# Patient Record
Sex: FEMALE | Race: BLACK OR AFRICAN AMERICAN | NOT HISPANIC OR LATINO | URBAN - METROPOLITAN AREA
[De-identification: names, ages, dates, MRNs, and addresses within clinical notes are randomized per-mention and may not be internally consistent; named-entity substitution may affect disease eponyms.]

---

## 2023-11-03 ENCOUNTER — OFFICE VISIT (OUTPATIENT)
Dept: OBSTETRICS AND GYNECOLOGY | Facility: CLINIC | Age: 38
End: 2023-11-03
Payer: COMMERCIAL

## 2023-11-03 VITALS
HEIGHT: 69 IN | BODY MASS INDEX: 40.88 KG/M2 | DIASTOLIC BLOOD PRESSURE: 80 MMHG | WEIGHT: 276 LBS | SYSTOLIC BLOOD PRESSURE: 136 MMHG

## 2023-11-03 DIAGNOSIS — N93.9 ABNORMAL UTERINE BLEEDING (AUB): Primary | ICD-10-CM

## 2023-11-03 DIAGNOSIS — Z11.3 ROUTINE SCREENING FOR STI (SEXUALLY TRANSMITTED INFECTION): ICD-10-CM

## 2023-11-03 DIAGNOSIS — Z30.430 ENCOUNTER FOR IUD INSERTION: ICD-10-CM

## 2023-11-03 DIAGNOSIS — N93.8 OTHER SPECIFIED ABNORMAL UTERINE AND VAGINAL BLEEDING: ICD-10-CM

## 2023-11-03 PROBLEM — B00.9 HERPES: Status: ACTIVE | Noted: 2023-11-03

## 2023-11-03 PROBLEM — R68.89 COLD INTOLERANCE: Status: ACTIVE | Noted: 2023-11-03

## 2023-11-03 PROBLEM — Z97.5 IUD (INTRAUTERINE DEVICE) IN PLACE: Status: ACTIVE | Noted: 2023-11-03

## 2023-11-03 PROBLEM — B96.89 BACTERIAL VAGINITIS: Status: ACTIVE | Noted: 2023-11-03

## 2023-11-03 PROBLEM — E66.9 OBESITY: Status: ACTIVE | Noted: 2023-11-03

## 2023-11-03 PROBLEM — E78.5 HYPERLIPIDEMIA: Status: ACTIVE | Noted: 2023-11-03

## 2023-11-03 PROBLEM — L50.9 URTICARIA: Status: ACTIVE | Noted: 2023-11-03

## 2023-11-03 PROBLEM — F32.A DEPRESSION: Status: ACTIVE | Noted: 2023-11-03

## 2023-11-03 PROBLEM — E03.9 HYPOTHYROID: Status: ACTIVE | Noted: 2023-11-03

## 2023-11-03 PROBLEM — L50.8 OTHER URTICARIA: Status: ACTIVE | Noted: 2022-02-08

## 2023-11-03 PROBLEM — N76.0 BACTERIAL VAGINITIS: Status: ACTIVE | Noted: 2023-11-03

## 2023-11-03 PROBLEM — R73.03 PREDIABETES: Status: ACTIVE | Noted: 2023-11-03

## 2023-11-03 PROBLEM — L25.9 CONTACT DERMATITIS: Status: ACTIVE | Noted: 2023-11-03

## 2023-11-03 PROBLEM — G56.03 CARPAL TUNNEL SYNDROME ON BOTH SIDES: Status: ACTIVE | Noted: 2023-11-03

## 2023-11-03 PROBLEM — N39.0 RECURRENT URINARY TRACT INFECTION: Status: ACTIVE | Noted: 2023-11-03

## 2023-11-03 LAB — PREGNANCY TEST URINE, POC: NEGATIVE

## 2023-11-03 PROCEDURE — 87591 N.GONORRHOEAE DNA AMP PROB: CPT

## 2023-11-03 PROCEDURE — 87661 TRICHOMONAS VAGINALIS AMPLIF: CPT

## 2023-11-03 PROCEDURE — 87800 DETECT AGNT MULT DNA DIREC: CPT

## 2023-11-03 PROCEDURE — 87491 CHLMYD TRACH DNA AMP PROBE: CPT

## 2023-11-03 PROCEDURE — 81025 URINE PREGNANCY TEST: CPT | Performed by: STUDENT IN AN ORGANIZED HEALTH CARE EDUCATION/TRAINING PROGRAM

## 2023-11-03 PROCEDURE — 99214 OFFICE O/P EST MOD 30 MIN: CPT | Performed by: STUDENT IN AN ORGANIZED HEALTH CARE EDUCATION/TRAINING PROGRAM

## 2023-11-03 PROCEDURE — 88305 TISSUE EXAM BY PATHOLOGIST: CPT

## 2023-11-03 PROCEDURE — 58100 BIOPSY OF UTERUS LINING: CPT | Performed by: STUDENT IN AN ORGANIZED HEALTH CARE EDUCATION/TRAINING PROGRAM

## 2023-11-03 RX ORDER — CETIRIZINE HYDROCHLORIDE 10 MG/1
1 TABLET ORAL
COMMUNITY
Start: 2021-01-06

## 2023-11-03 RX ORDER — VALACYCLOVIR HYDROCHLORIDE 500 MG/1
1 TABLET, FILM COATED ORAL DAILY
COMMUNITY
Start: 2023-06-05 | End: 2023-12-26 | Stop reason: SDUPTHER

## 2023-11-03 RX ORDER — QUETIAPINE FUMARATE 400 MG/1
1 TABLET, FILM COATED ORAL NIGHTLY
COMMUNITY

## 2023-11-03 RX ORDER — LITHIUM CARBONATE 300 MG/1
TABLET, FILM COATED, EXTENDED RELEASE ORAL
COMMUNITY
Start: 2023-07-05

## 2023-11-03 RX ORDER — TRETINOIN 0.25 MG/G
CREAM TOPICAL
COMMUNITY

## 2023-11-03 RX ORDER — DIVALPROEX SODIUM 500 MG/1
1000 TABLET, FILM COATED, EXTENDED RELEASE ORAL NIGHTLY
COMMUNITY
Start: 2023-06-28

## 2023-11-03 RX ORDER — CYCLOBENZAPRINE HCL 10 MG
1 TABLET ORAL NIGHTLY
COMMUNITY
Start: 2020-02-10

## 2023-11-03 RX ORDER — ASPIRIN 81 MG/1
1 TABLET ORAL DAILY
COMMUNITY

## 2023-11-03 RX ORDER — LEVOTHYROXINE SODIUM 25 UG/1
1 TABLET ORAL DAILY
COMMUNITY
Start: 2021-01-25

## 2023-11-03 RX ORDER — FAMOTIDINE 40 MG/1
1 TABLET, FILM COATED ORAL DAILY
COMMUNITY
Start: 2023-11-01

## 2023-11-03 RX ORDER — HYDROXYZINE HYDROCHLORIDE 25 MG/1
1 TABLET, FILM COATED ORAL EVERY 8 HOURS PRN
COMMUNITY
Start: 2021-01-06

## 2023-11-03 RX ORDER — PROPRANOLOL HYDROCHLORIDE 20 MG/1
1 TABLET ORAL 2 TIMES DAILY
COMMUNITY

## 2023-11-03 RX ORDER — DIPHENHYDRAMINE HCL 25 MG
1 CAPSULE ORAL
COMMUNITY
Start: 2021-04-29

## 2023-11-03 RX ORDER — LEVONORGESTREL 52 MG/1
INTRAUTERINE DEVICE INTRAUTERINE
COMMUNITY

## 2023-11-03 RX ORDER — DIVALPROEX SODIUM 250 MG/1
3 TABLET, FILM COATED, EXTENDED RELEASE ORAL NIGHTLY
COMMUNITY
Start: 2023-04-05

## 2023-11-03 RX ORDER — ESCITALOPRAM OXALATE 20 MG/1
1 TABLET ORAL DAILY
COMMUNITY
Start: 2021-01-06

## 2023-11-03 RX ORDER — EPINEPHRINE 0.3 MG/.3ML
INJECTION SUBCUTANEOUS
COMMUNITY
Start: 2021-06-11

## 2023-11-03 NOTE — PROGRESS NOTES
Subjective   Patient ID: Luz Mayes is a 38 y.o. female who presents for Vaginal Bleeding.  Presents for discussion of AUB.  Had been having regular monthly menses but missed her period in August, then period started the first week of September and didn't stop until 10/19.  Never had anything like this before.  Very heavy at times with passage of large blood clots.        Review of Systems   All other systems reviewed and are negative.      Objective   Physical Exam  Constitutional:       Appearance: Normal appearance.   HENT:      Head: Normocephalic and atraumatic.      Nose: Nose normal.      Mouth/Throat:      Mouth: Mucous membranes are moist.      Pharynx: No oropharyngeal exudate or posterior oropharyngeal erythema.   Eyes:      Extraocular Movements: Extraocular movements intact.      Conjunctiva/sclera: Conjunctivae normal.   Pulmonary:      Effort: Pulmonary effort is normal.   Genitourinary:     Vagina: Normal.      Cervix: Normal.      Uterus: Normal.       Rectum: Normal.      Comments: Uterus anteverted  Musculoskeletal:      Cervical back: Normal range of motion.   Skin:     Findings: No bruising, erythema, lesion or rash.   Neurological:      General: No focal deficit present.      Mental Status: She is alert.   Psychiatric:         Mood and Affect: Mood normal.         Behavior: Behavior normal.         Thought Content: Thought content normal.         Judgment: Judgment normal.         IUD Insertion    Date/Time: 11/3/2023 3:26 PM    Performed by: Brandy Jensen MD  Authorized by: Brandy Jensen MD    Consent:     Consent obtained:  Verbal and written    Consent given by:  Patient    Procedure risks and benefits discussed: yes      Patient questions answered: yes      Patient agrees, verbalizes understanding, and wants to proceed: yes      Educational handouts given: yes      Instructions and paperwork completed: yes    Universal protocol:     Patient states understanding of procedure being  performed: yes      Relevant documents present and verified: yes      Test results available and properly labeled: yes      Imaging studies available: yes      Required blood products, implants, devices, and special equipment available: yes      Site marked: yes    Procedure:     Pelvic exam performed: yes      Negative GC/chlamydia test: ordered, pending.      Negative urine pregnancy test: yes      Cervix cleaned and prepped: yes      Speculum placed in vagina: yes      Tenaculum applied to cervix: yes      Uterus sounded: yes      Uterus sound depth (cm):  9    IUD inserted with no complications: yes      IUD type:  Mirena    Strings trimmed: yes    Post-procedure:     Patient tolerated procedure well: yes    Endometrial biopsy    Date/Time: 11/3/2023 5:25 PM    Performed by: Brandy Jensen MD  Authorized by: Brandy Jensen MD    Consent:     Consent obtained: written and verbal    Consent given by: patient    Risks discussed: bleeding, infection and pain    Patient agrees, verbalizes understanding, and wants to proceed: yes    Universal protocol:     Test results available and properly labeled: yes      Relevant documents present and verified: yes      Imaging studies available: yes      Required blood products, implants, devices, and special equipment available: yes      Site/side marked: yes      Immediately prior to procedure a time out was called: yes      Patient identity confirmed: verbally with patient  Indications:     Indications: abnormal uterine bleeding      Chronicity of post-menopausal bleeding: new  Pre-procedure:     Urine pregnancy test: negative    Procedure:     A bimanual exam was performed: yes      Uterus size: 9-10 weeks    Uterus position: anteverted    Prepped with: Betadine    Tenaculum used: yes      A local block was performed: yes      Block method: paracervical block      Local anesthetic: lidocaine 1% WITH epi    Amount used (mL): 12    Cervix dilated: no      Number of passes:  1  Findings:     Cervix: normal      Uterus depth by sound (cm): 9    Specimen collected: specimen collected and sent to pathology      Patient tolerance: tolerated well, no immediate complications        Assessment/Plan   Diagnoses and all orders for this visit:  Abnormal uterine bleeding (AUB)        -     Discussed with patient the broad differential diagnosis of AUB prior to menopause, including structural etiologies such as polyps, adenomyosis, malignancy, hyperplasia, or leiomyomas, as well as non-structural etiologies including coagulopathy, ovulatory dysfunction, endometrial dysfunction, and iatrogenic etiologies. Reviewed that optimal therapy is often dependent on etiology of bleeding, though most patients with AUB may benefit from either medical or surgical management, depending on their goals and preferences.  Reviewed that medication management, in general, may include short-acting options such as oral hormonal or non-hormonal therapies, injectable agents administered at 1-3 month intervals depending on the agent, and implantable therapies such as the levonorgestrel intrauterine device or Nexplanon subdermal contraceptive implant.  Reviewed that surgical options, in general, include less invasive procedures such as uterine artery embolization, endometrial ablation, and hysteroscopy with resection of structural lesions, if present, while hysterectomy represents definitive management, but is also a major surgery, even if performed via minimally invasive techniques.        -     following counseling as above, she was amenable to EMB and trial of Mirena        -     POC pregnancy, urine, was negative  -     Endometrial biopsy completed  -     levonorgestrel (Mirena) 21 mcg/24 hours (8 yrs) 52 mg IUD placed  -     C. Trachomatis / N. Gonorrhoeae, Amplified Detection, and Trichomonas vaginalis, Amplified, were sent today  -     Pelvic ultrasound also ordered to assess for structural abnormalities.  Will review  NV    She will be due for pap in January and we discussed that this would be a good time to follow-up to assess her response to therapy.  She was amenable to same.  To call office with problems in the interim.    Brandy Jensen MD

## 2023-11-04 LAB
C TRACH RRNA SPEC QL NAA+PROBE: NEGATIVE
N GONORRHOEA DNA SPEC QL PROBE+SIG AMP: NEGATIVE
T VAGINALIS RRNA SPEC QL NAA+PROBE: NEGATIVE

## 2023-11-20 ENCOUNTER — HOSPITAL ENCOUNTER (OUTPATIENT)
Dept: RADIOLOGY | Facility: CLINIC | Age: 38
Discharge: HOME | End: 2023-11-20
Payer: COMMERCIAL

## 2023-11-20 DIAGNOSIS — N93.9 ABNORMAL UTERINE BLEEDING (AUB): ICD-10-CM

## 2023-11-20 PROCEDURE — 76856 US EXAM PELVIC COMPLETE: CPT | Performed by: RADIOLOGY

## 2023-11-20 PROCEDURE — 76830 TRANSVAGINAL US NON-OB: CPT

## 2023-11-20 PROCEDURE — 76830 TRANSVAGINAL US NON-OB: CPT | Performed by: RADIOLOGY

## 2023-11-24 LAB
LABORATORY COMMENT REPORT: NORMAL
PATH REPORT.FINAL DX SPEC: NORMAL
PATH REPORT.GROSS SPEC: NORMAL
PATH REPORT.RELEVANT HX SPEC: NORMAL
PATH REPORT.TOTAL CANCER: NORMAL

## 2023-12-26 DIAGNOSIS — A60.04 HERPES SIMPLEX VULVOVAGINITIS: Primary | ICD-10-CM

## 2023-12-26 RX ORDER — VALACYCLOVIR HYDROCHLORIDE 500 MG/1
500 TABLET, FILM COATED ORAL DAILY
Qty: 30 TABLET | Refills: 11 | Status: SHIPPED | OUTPATIENT
Start: 2023-12-26 | End: 2024-12-25

## 2023-12-26 RX ORDER — VALACYCLOVIR HYDROCHLORIDE 500 MG/1
TABLET, FILM COATED ORAL
Qty: 90 TABLET | Refills: 0 | OUTPATIENT
Start: 2023-12-26

## 2024-01-12 ENCOUNTER — OFFICE VISIT (OUTPATIENT)
Dept: OBSTETRICS AND GYNECOLOGY | Facility: CLINIC | Age: 39
End: 2024-01-12
Payer: COMMERCIAL

## 2024-01-12 VITALS
HEIGHT: 69 IN | DIASTOLIC BLOOD PRESSURE: 78 MMHG | BODY MASS INDEX: 42.06 KG/M2 | SYSTOLIC BLOOD PRESSURE: 120 MMHG | WEIGHT: 284 LBS

## 2024-01-12 DIAGNOSIS — Z01.419 ENCOUNTER FOR GYNECOLOGICAL EXAMINATION WITHOUT ABNORMAL FINDING: ICD-10-CM

## 2024-01-12 DIAGNOSIS — Z12.31 ENCOUNTER FOR SCREENING MAMMOGRAM FOR BREAST CANCER: ICD-10-CM

## 2024-01-12 DIAGNOSIS — Z01.419 WELL WOMAN EXAM: ICD-10-CM

## 2024-01-12 DIAGNOSIS — Z11.3 ROUTINE SCREENING FOR STI (SEXUALLY TRANSMITTED INFECTION): Primary | ICD-10-CM

## 2024-01-12 PROCEDURE — 87624 HPV HI-RISK TYP POOLED RSLT: CPT

## 2024-01-12 PROCEDURE — 99396 PREV VISIT EST AGE 40-64: CPT | Performed by: STUDENT IN AN ORGANIZED HEALTH CARE EDUCATION/TRAINING PROGRAM

## 2024-01-12 PROCEDURE — 87661 TRICHOMONAS VAGINALIS AMPLIF: CPT

## 2024-01-12 PROCEDURE — 87800 DETECT AGNT MULT DNA DIREC: CPT

## 2024-01-12 PROCEDURE — 1036F TOBACCO NON-USER: CPT | Performed by: STUDENT IN AN ORGANIZED HEALTH CARE EDUCATION/TRAINING PROGRAM

## 2024-01-12 PROCEDURE — 88141 CYTOPATH C/V INTERPRET: CPT | Performed by: PATHOLOGY

## 2024-01-12 PROCEDURE — 88142 CYTOPATH C/V THIN LAYER: CPT

## 2024-01-12 ASSESSMENT — PAIN SCALES - GENERAL: PAINLEVEL: 0-NO PAIN

## 2024-01-12 NOTE — PROGRESS NOTES
Subjective   Luz Mayes is a 38 y.o. female here for a routine exam. Current complaints: none.     Since IUD placement bleeding has been well controlled and she is happy with this method, amenable to continue.    Gynecologic History  No LMP recorded. Patient has had an implant.  Contraception: IUD  Last Pap: 1/2019. Results were: normal  Last mammogram: needs.    Objective   Physical Exam  Vitals and nursing note reviewed.   Constitutional:       Appearance: Normal appearance.   HENT:      Head: Normocephalic and atraumatic.      Nose: Nose normal.      Mouth/Throat:      Mouth: Mucous membranes are moist.   Eyes:      Extraocular Movements: Extraocular movements intact.      Conjunctiva/sclera: Conjunctivae normal.   Pulmonary:      Effort: Pulmonary effort is normal.   Chest:      Chest wall: No deformity or swelling.   Breasts:     Breasts are symmetrical.      Right: Normal.      Left: Normal.   Abdominal:      General: There is no distension.      Palpations: Abdomen is soft. There is no mass.      Tenderness: There is no abdominal tenderness.   Genitourinary:     General: Normal vulva.      Vagina: Normal.      Cervix: Normal.      Uterus: Normal.       Adnexa: Right adnexa normal and left adnexa normal.      Rectum: Normal.   Musculoskeletal:      Cervical back: Normal range of motion.   Skin:     General: Skin is warm and dry.   Neurological:      General: No focal deficit present.      Mental Status: She is alert.   Psychiatric:         Mood and Affect: Mood normal.         Behavior: Behavior normal.         Thought Content: Thought content normal.         Judgment: Judgment normal.          Assessment/Plan   Healthy female exam.  1. Routine screening for STI (sexually transmitted infection)  - HIV 1/2 Antigen/Antibody Screen with Reflex to Confirmation; Future  - Syphilis Screen with Reflex; Future  - Hepatitis C antibody; Future  - Hepatitis B Surface Antigen; Future  - BI mammo bilateral  screening tomosynthesis; Future  - HPV DNA High Risk With Genotype  - C. Trachomatis / N. Gonorrhoeae, Amplified Detection  - Trichomonas vaginalis, Nucleic Acid Detection    2. Encounter for screening mammogram for breast cancer  - BI mammo bilateral screening tomosynthesis; Future    3. Encounter for gynecological examination without abnormal finding  - THINPREP PAP TEST    Follow up for well care or as needed    Brandy Jensen MD

## 2024-01-16 LAB
C TRACH RRNA SPEC QL NAA+PROBE: NEGATIVE
N GONORRHOEA DNA SPEC QL PROBE+SIG AMP: NEGATIVE

## 2024-01-17 LAB — T VAGINALIS RRNA SPEC QL NAA+PROBE: NEGATIVE

## 2024-01-29 LAB
CYTOLOGY CMNT CVX/VAG CYTO-IMP: NORMAL
HPV HR 12 DNA GENITAL QL NAA+PROBE: NEGATIVE
HPV HR GENOTYPES PNL CVX NAA+PROBE: NEGATIVE
HPV16 DNA SPEC QL NAA+PROBE: NEGATIVE
HPV18 DNA SPEC QL NAA+PROBE: NEGATIVE
LAB AP HPV GENOTYPE QUESTION: YES
LAB AP HPV HR: NORMAL
LAB AP PAP ADDITIONAL TESTS: NORMAL
LABORATORY COMMENT REPORT: NORMAL
LABORATORY COMMENT REPORT: NORMAL
PATH REPORT.TOTAL CANCER: NORMAL

## 2024-12-28 DIAGNOSIS — A60.04 HERPES SIMPLEX VULVOVAGINITIS: ICD-10-CM

## 2025-01-03 DIAGNOSIS — A60.04 HERPES SIMPLEX VULVOVAGINITIS: ICD-10-CM

## 2025-01-03 RX ORDER — VALACYCLOVIR HYDROCHLORIDE 500 MG/1
500 TABLET, FILM COATED ORAL DAILY
Qty: 30 TABLET | Refills: 0 | Status: SHIPPED | OUTPATIENT
Start: 2025-01-03 | End: 2025-01-03 | Stop reason: SDUPTHER

## 2025-01-03 RX ORDER — VALACYCLOVIR HYDROCHLORIDE 500 MG/1
500 TABLET, FILM COATED ORAL DAILY
Qty: 90 TABLET | Refills: 3 | Status: SHIPPED | OUTPATIENT
Start: 2025-01-03 | End: 2026-01-03

## 2025-06-16 ENCOUNTER — APPOINTMENT (OUTPATIENT)
Dept: RADIOLOGY | Facility: HOSPITAL | Age: 40
End: 2025-06-16
Payer: COMMERCIAL

## 2025-06-16 DIAGNOSIS — Z12.31 SCREENING MAMMOGRAM FOR BREAST CANCER: ICD-10-CM

## 2025-06-23 ENCOUNTER — APPOINTMENT (OUTPATIENT)
Dept: RADIOLOGY | Facility: HOSPITAL | Age: 40
End: 2025-06-23
Payer: COMMERCIAL

## 2025-06-23 DIAGNOSIS — Z12.31 SCREENING MAMMOGRAM FOR BREAST CANCER: ICD-10-CM

## 2025-06-23 PROCEDURE — 77063 BREAST TOMOSYNTHESIS BI: CPT | Performed by: RADIOLOGY

## 2025-06-23 PROCEDURE — 77067 SCR MAMMO BI INCL CAD: CPT | Performed by: RADIOLOGY

## 2025-06-23 PROCEDURE — 77067 SCR MAMMO BI INCL CAD: CPT

## 2025-06-25 ENCOUNTER — HOSPITAL ENCOUNTER (OUTPATIENT)
Dept: RADIOLOGY | Facility: EXTERNAL LOCATION | Age: 40
Discharge: HOME | End: 2025-06-25

## 2025-07-20 ENCOUNTER — TELEPHONE (OUTPATIENT)
Dept: PRIMARY CARE | Facility: CLINIC | Age: 40
End: 2025-07-20
Payer: COMMERCIAL

## 2025-07-20 NOTE — TELEPHONE ENCOUNTER
Please advise is patient's insurance is Medicare or Medicaid; it is unclear.     she will coming for appt 7/22/25.Unclear if appt is a physical vs Medicare wellness visit. Please advise. Thank you

## 2025-07-20 NOTE — PROGRESS NOTES
"Subjective   Luz J Spikes \"En\" is a 40 y.o. female who presents for new patient visit to establish PCP care for  Medicare wellness Visit and annual physical exam.    HPI:      40 y.o. female ex-smoker (5 black and mild/day x  ; quit 2023) x  who presents for new patient visit to establish PCP care for Medicare wellness Visit and annual physical exam.         EMR/EPIC records reviewed    PMHx:  Bipolar disorder  CKD stage 3A  Hyperlipidemia  Hypothyroid  Depression  Bacterial vaginitis  Carpal tunnel syndrome on both sides  Cold intolerance  Contact dermatitis  Genital Herpes  Morbid obesity  Recurrent urinary tract infection  Urticaria  Onychomycosis  Uterine fibroids         Healthcare Providers:  GYN: Dr. Jensen  Psychiatry: Pilar Arnett APN    GI: None  [facility] Prior PCP: BOAZ Hewitt.CNP         Preventive Health Services:  -Last Medicare wellness visit/ physical: 7/22/2025  -last pap smear: 1/2024 Negative for intraepithelial lesion or malignancy.   -last mammogram: 6/23/2025 No mammographic evidence of malignancy. ;  Next due 6/23/2026  -last colonoscopy/cologuard:  never; + Fhx colon cancer father (?diagnosed at age 70s but unsure) and denies any other Fhx of colon cancer; colon cancer screening due at DUE at age 42 yo  -last STI screening: ?; declined STI testing  -Hep C screening: ?       Immunizations:  -Childhood vaccines: completed per patient  -updated COVID spike vaccine: NOW DUE  -TDAP: Now due      Immunization History   Administered Date(s) Administered    Flu vaccine (IIV4), preservative free *Check age/dose* 10/24/2018       Today En reports:     - Ongoing intermittent abdominal pain, cramping, and constipation x 1+ years, rated in severity as /10, not worsening over time.  She denies fever/chills, nausea/vomiting, abdominal bloating,, unintentional weight loss, early satiety, bloody stools, melena, or family history of colon or GI cancer.  At her last visit with her prior PCP at " [facility] referral to gastroenterology was ordered for evaluation of abdominal pain, cramping and constipation although she reports that she never scheduled the appointment.  Today she expressed interest in referral to gastroenterology for further evaluation.    -Mood is good.  She denies depressive, manic, or psychotic symptoms.  She denies suicidal or homicidal ideation, intent, or plan.  Follows with psychiatry and is taking all psychiatric medications as prescribed    - Otherwise doing and feeling well     -taking all medications as prescribed with no reported adverse medication side effects    -smokes cannabis that she gets from a dispensary. She denies any other illicit drug use.           Today   she has no other reported complaints, issues, or problems.  ROS is NEG for HEADACHE, NAUSEA, VOMITING, DIARRHEA, CHEST PAIN, SOB, and BLEEDING.   Review of systems (12) is negative for all systems except for any identified issues in HPI above.        Health Maintenance    Social History:  Tobacco: ex-smoker  EtOH: denies  Patient reports routine vision checks and dental cleanings, and regular exercise.     Advanced Directives:  Patient does not have advanced directives in place.  Counseled and discussed importance with patient during visit today.  Provided assistance as necessary.    Opioid Medications:  Does the patient use opioid medications: no      Overall Health:  How does the patient rate their health status today:  good     Cognitive Screen:  AAAx3  to person, place and time: Yes  3 word recall: Banana, Chair, Sunrise  - Immediate recall: Yes  - 5 minutes recall: Yes  Impression: No cognitive deficiency observed during screening or encounter today     Vision/Hearing Screen:  Vision (required for WELCOME TO MEDICARE VISIT ONLY):  na  Hearing screen: reports no difficulty with hearing and passes finger rub test bilaterally    Immunizations:  -Childhood vaccines: completed per patient  -updated COVID spike vaccine: NOW  DUE  -TDAP: Now due    Flu:  see below      Immunization History   Administered Date(s) Administered    Flu vaccine (IIV4), preservative free *Check age/dose* 10/24/2018       Preventive Health Services:  -Last physical: 7/22/2025  -last pap smear: 1/2024 Negative for intraepithelial lesion or malignancy.   -last mammogram: 6/23/2025 No mammographic evidence of malignancy. ;  Next due 6/23/2026  -last colonoscopy/cologuard:   -last STI screening:   -Hep C screening:        Screening Pap due 21-65, Q3, Q5 with nml HPV after 31 y/o  Screening Mammogram :  yearly ages 40-75, shared decision making age >75  Screening Colonoscopy:  age 45-75, shared decision making age >75  DEXA scan 65 or 60 with risks, [id number] after  AAA screen men 65-75 men with ANY smoking history  Low dose CT of lungs:  yearly for 50-80 with at least 20 year pack history.  Current smokers and those who quit <15 years ago.  Coronary Ca score men >45, women >55,   Hep C screen:  one time all adults age 18-79        Reviewed:   Past Medical History/Allergies:  Yes  Family History:  Yes  Social History:  Yes  Current Medications:  Yes  Vital Signs:  Yes  Advanced Directives:  discussed  Immunizations:  reviewed today  Home Safety:                    Up & Go test > 30 seconds?  No                   Home have rugs; lack grab bars in bathroom; lack handrail on stairs; have poor lighting?  No                   Hearing difficulties?  No  Geriatric Assessment           ADL areas requiring assistance:  Does not need help with , Dressing, Eating, Ambulating, Toileting, Grooming, Hygiene.            IADL areas requiring assistance:  Does not need help with , Shopping, Housework, Accounting, Transportation, Driving.   Medications reviewed           Current supplements  Reviewed and recorded.   Other providers           Reviewed and recorded  Current providers and suppliers:     PHQ9/GAD7:  Over the past 2 weeks, how often have you been bothered by any of the  following problems?  Trouble falling or staying asleep, or sleeping too much: Several days  Feeling tired or having little energy: Several days  Poor appetite or overeating: Not at all  Feeling bad about yourself - or that you are a failure or have let yourself or your family down: Several days  Trouble concentrating on things, such as reading the newspaper or watching television: Several days  Moving or speaking so slowly that other people could have noticed? Or the opposite - being so fidgety or restless that you have been moving around a lot more than usual.: Nearly every day  Thoughts that you would be better off dead or hurting yourself in some way: Several days  Patient Health Questionnaire-9 Score: 10  Over the last 2 weeks, how often have you been bothered by any of the following problems?  Feeling nervous, anxious, or on edge: Several days  Not being able to stop or control worrying: More than half the days  Worrying too much about different things: More than half the days  Trouble relaxing: Several days  Being so restless that it is hard to sit still: Several days  Becoming easily annoyed or irritable: Several days  Feeling afraid as if something awful might happen: Nearly every day  PAT-7 Total Score: 11      Current Medications  Current Outpatient Medications   Medication Instructions    cetirizine (ZyrTEC) 10 mg tablet 1 tablet    cyclobenzaprine (Flexeril) 10 mg tablet 1 tablet, oral, Nightly    diphenhydrAMINE (BenadryL) 25 mg capsule 1 capsule    divalproex (DEPAKOTE ER) 1,000 mg, Nightly    EPINEPHrine 0.3 mg/0.3 mL injection syringe  1 Pen Needle    escitalopram (Lexapro) 20 mg tablet 1 tablet, oral, Daily    famotidine (PEPCID) 1 mg, Daily    hydrOXYzine HCL (Atarax) 25 mg tablet 1 tablet, oral, Every 8 hours PRN    levonorgestrel (Mirena) 21 mcg/24 hours (8 yrs) 52 mg IUD intrauterine    levothyroxine (Synthroid, Levoxyl) 25 mcg tablet 1 tablet, Daily    lithium ER (Lithobid) 300 mg 12 hr tablet  Take by mouth.    multivit,calc,mins/iron/folic (ONE DAILY WOMEN'S ORAL) oral    MULTIVITAMIN ORAL 1 tablet, oral, Daily RT    propranolol (Inderal) 20 mg tablet 1 tablet, 2 times daily    QUEtiapine (SEROquel) 400 mg tablet 1 tablet, Nightly    tretinoin (Retin-A) 0.025 % cream  APPLY A PEA-SIZED AMOUNT TO THE AFFECTED AREA ONCE DAILY AT NIGHT. START EVERY THIRD DAY AND GRADUALLY BUILD UP TO EVERY NIGHT.    valACYclovir (VALTREX) 500 mg, oral, Daily        History  Allergies[1]   Medical History[2]   Surgical History[3]  Family History[4]  Social History[5]  Tobacco Use: Medium Risk (7/22/2025)    Patient History     Smoking Tobacco Use: Former     Smokeless Tobacco Use: Never     Passive Exposure: Past        ROS  All pertinent positive symptoms are included in the history of present illness.  All other systems have been reviewed and are negative and noncontributory to this patient's current ailments.    VITAL SIGNS  Vitals:    07/22/25 1341   BP: 126/86   Pulse: 90   Temp: 36 °C (96.8 °F)   SpO2: 96%     Vitals:    07/22/25 1341   Weight: 119 kg (262 lb 9.6 oz)      Body mass index is 37.15 kg/m².     PHYSICAL EXAM    GENERAL  Well-appearing, pleasant and cooperative.  No acute distress.    HEENT  HEAD:   Normocephalic.  Atraumatic.  EYES:  PERRLA.  No scleral icterus or conjunctival injection.  EARS:  Tympanic membranes visualized bilaterally without erythema, fluid, or bulging.  NECK:  No adenopathy. Diffusely enlarged thyroid, non-tender to palpation, no palpable thyroid nodules or masses.    THROAT:  Moist oropharynx without tonsillar enlargement or exudates.    LUNGS:    Clear to auscultation bilaterally.  No wheezes, rales, rhonchi.    CARDIAC:  Regular rate and rhythm.  Normal S1S2.  No murmurs/rubs/gallops.    ABDOMEN:  Soft, obese, non-tender, non-distended.  No hepatosplenomegaly.  Normoactive bowel sounds.    MUSCULOSKELETAL:  No gross abnormalities.   No joint swelling or erythema,.  No spinal or  paraspinal tenderness to palpation.    EXTREMITIES:  No LE edema or cyanosis.      NEURO           Alert and oriented x3. No focal deficits.    PSYCH:          Affect appropriate.   SKIN: flaking of scalp most consistent with seborrheic dermatitis, no signs of tinea capitis.  No other rashes. No lesions.    Preventative Services reviewed with patient, instructions provided           Assessment/Plan   Assessment & Plan  Annual physical exam    Orders:    TSH with reflex to Free T4 if abnormal; Future    Urinalysis with Reflex Microscopic; Future    Comprehensive Metabolic Panel; Future    CBC and Auto Differential; Future    Morbid obesity (Multi)    Orders:    Referral to Nutrition Services; Future    Referral to Endocrinology; Future    Mixed hyperlipidemia         Vitamin D deficiency    Orders:    Vitamin D 25-Hydroxy,Total (for eval of Vitamin D levels); Future    Encounter for hepatitis C screening test for low risk patient    Orders:    Hepatitis C Antibody; Future    Routine screening for STI (sexually transmitted infection)         Breast cancer screening by mammogram         Cervical cancer screening         Bipolar affective disorder, remission status unspecified (Multi)         Abdominal pain, unspecified abdominal location    Orders:    Referral to Gastroenterology; Future    Amylase; Future    Lipase; Future    Medicare annual wellness visit, subsequent         Depression screening         Advanced care planning/counseling discussion         Cardiac risk counseling         Decreased GFR    Orders:    Albumin-Creatinine Ratio, Urine Random; Future    Seborrheic dermatitis    Orders:    ketoconazole (NIZOral) 2 % shampoo; Apply topically 2 times a week. Shampoo daily, leave on for 5-10 minutes, then rinse.    Referral to Dermatology    Encounter for routine laboratory testing    Orders:    CBC and Auto Differential; Future    Hair loss    Orders:    CBC and Auto Differential; Future    Referral to  Dermatology    Constipation, unspecified constipation type    Orders:    polyethylene glycol (Glycolax, Miralax) 17 gram packet; Take 8.5 g by mouth once daily. Mix 1/2 cap (8.5g) into 4 ounces of fluid.    Enlarged thyroid    Orders:    US thyroid; Future       Assessment/Plan   Problem List Items Addressed This Visit       Hyperlipidemia                   Other Visit Diagnoses         Medicare annual wellness visit, subsequent    -  Primary      Annual physical exam          Morbid obesity (Multi)          Vitamin D deficiency          Encounter for hepatitis C screening test for low risk patient          Routine screening for STI (sexually transmitted infection)          Breast cancer screening by mammogram          Cervical cancer screening          Bipolar affective disorder, remission status unspecified (Multi)          Abdominal pain, unspecified abdominal location          Depression screening          Advanced care planning/counseling discussion          Cardiac risk counseling          Decreased GFR              Medicare annual wellness visit and Annual Physical Exam: Completed today  -labs ordered (see A/P above)      Bipolar disorder II: No red flag signs or symptoms today.  Followed by psychiatry  - Continue psychiatric medications and management by psychiatry  - Emergency department and 911/EMS psychiatric precautions discussed and reviewed with patient    Seborrheic dermatitis of the scalp:  -start ketoconazole shampoo daily x 2 weeks, and then every other day  -referral to dermatology ordered     Enlarged thyroid  -thyroid US ordered  -TSH/T4 ordered      Morbid obesity  - Lipid panel ordered  -Patient encouraged to lose weight  -Referral to nutrition and endocrinology weight management ordered  -low carbohydrate, low cholesterol diet, regularly exercise, and limit alcohol intake    Hyperlipidemia  -lipid panel ordered  -low carbohydrate, low cholesterol diet, regularly exercise, and limit alcohol  intake    CKD stage 3A: GFR 57 (7/18/25)  -CMP ordered and urine albumin ordered  -patient advised to avoid NSAID medications that can worsen kidney function    Intermittent abdominal pain and bloating: No red flag signs or symptoms today  -CMP, amylase, lipase ordered  -referral to gastroenterology ordered for further evaluation  -Patient advised to avoid constipation and take MiraLAX daily as needed  -Emergency department precautions discussed and reviewed with patient     Vitamin D deficiency  -Vit D levels ordered     Hep C screening  -Hep C antibody ordered     STI Screening:  -HIV, syphilis, GC/CT/trich ordered    Breast cancer screening: Up-to-date.  Mammogram next due 6/2026    Cervical cancer screening: Up-to-date  - Continue annual well woman visits and Pap smears with GYN     Colon Cancer Screening: Due at age 43 years old     Depression Screening  Depression screening completed using the PHQ-2 questions with results documented in the chart/encounter (15min)  (See Rooming Screening section for documentation, and/or progress note for additional information)     Cardiac Risk Assessment:  Cardiovascular risk was discussed and ,if needed, lifestyle modifications recommended, including nutritional choices, exercise, and elimination of habits contributing to risk.  We agreed on a plan to reduce the current cardiovascular risk based on above discussion as needed.  Aspirin use/disuse was discussed and documented in the Problem List of the medical record (under Cardiac Risk Counseling) after reviewing the updated guidelines below:  Consider low dose Aspirin ( mg) use if the benefit for cardiovascular disease prevention outweighs risk for bleeding complications.  In general, low dose ASA should be considered:  In patients WITHOUT prior MI/stroke/PAD (primary prevention):  a.Age <60: Use if 10-year cardiovascular disease risk >20%, with discussion of risks and benefits with patient  b.Age 60-<70: Use if  10-year cardiovascular disease risk >20% and low bleeding (e.g., gastrointestinal) risk, with discussion of risks and benefits with patient  c.Age >=70: Do not use  In patients WITH prior MI/stroke/PAD (secondary prevention):  Generally use unless extremely high bleeding (e.g., gastrointestinal) risk, with discussion of risks and benefits with patient  (~16 min spent discussing above)     Advance Directives Discussion  Advanced Care Planning (including a Living Will, Healthcare POA, as well as specific end of life choices and/or directives), was discussed with the patient and/or surrogate, voluntarily, and details of that discussion documented in the Problem List (under Advanced Directives Discussion) of the medical record.  (~16 min spent discussing above)       Counseling:      Medication education:        Education:  The patient is counseled regarding potential side-effects of all new medications        Understanding:  Patient expressed understanding        Adherence:  No barriers to adherence identified        Immunizations Counseling  -TDAP now due==> declined  -recommend updated COVID spike vaccine that can be obtained at local pharmacy     FOLLOW-UP: 4 weeks to discuss and review test results    I have personally reviewed all available pertinent labs, imaging, and consult notes with the patient.     Discussed recommended plan of care with patient. Patient expressed understanding and agreement with plan of care. All of patient's  questions were answered at the time. Patient had no additional questions at the time.         Ruma Bowman MD, PhD         [1]   Allergies  Allergen Reactions    Naproxen Sodium Unknown    Sertraline Unknown   [2]   Past Medical History:  Diagnosis Date    Acute vaginitis 07/05/2019    BV (bacterial vaginosis)    Allergic     Anxiety     Candidiasis, unspecified 04/05/2019    Yeast infection    Depression     Eczema     Elevated blood-pressure reading, without diagnosis of  hypertension 02/10/2020    Elevated blood pressure reading    Encounter for other general counseling and advice on contraception 08/02/2019    General counselling and advice on contraception    Encounter for removal of intrauterine contraceptive device 03/10/2021    Encounter for IUD removal    Encounter for screening for infections with a predominantly sexual mode of transmission 04/05/2019    Screening for STDs (sexually transmitted diseases)    Inflammatory bowel disease     Personal history of other benign neoplasm 07/06/2019    History of uterine leiomyoma    Personal history of other diseases of the female genital tract 07/06/2019    History of intermenstrual bleeding    Personal history of other diseases of the female genital tract 08/02/2019    History of abnormal uterine bleeding    Personal history of other diseases of the female genital tract 08/02/2019    History of vaginal discharge    Personal history of other drug therapy 10/24/2018    History of influenza vaccination    Personal history of other infectious and parasitic diseases 01/16/2019    History of herpes genitalis    Personal history of other mental and behavioral disorders 05/13/2019    History of anxiety disorder    Personal history of urinary (tract) infections 01/06/2021    History of urinary tract infection    Poisoning by unspecified drugs, medicaments and biological substances, intentional self-harm, initial encounter (Multi) 09/12/2018    Suicidal overdose    Urinary tract infection     Varicella     Visual impairment    [3]   Past Surgical History:  Procedure Laterality Date    OTHER SURGICAL HISTORY  09/12/2018    Oral Surgery Tooth Extraction [place] [facility]   [4]   Family History  Problem Relation Name Age of Onset    Skin cancer Father Brice Spikes 70 - 79    Hodgkin's lymphoma Father's Sister      Uterine cancer Maternal Grandmother Gran 60 - 69    Lung cancer Paternal Grandfather      Diabetes Other grandparent     Colon cancer  Father Brice Mayes     Breast cancer Maternal Grandmother Krish    [5]   Social History  Tobacco Use    Smoking status: Former     Current packs/day: 1.00     Average packs/day: 1 pack/day for 10.0 years (10.0 ttl pk-yrs)     Types: Cigarettes, Cigars     Passive exposure: Past    Smokeless tobacco: Never   Vaping Use    Vaping status: Never Used   Substance Use Topics    Alcohol use: Yes     Comment: I'm a social drinker. Don't drink every week. Lately haven't drunk at all. Since last month    Drug use: Yes     Types: Marijuana     Comment: Dispensary only. Don't smoke everyday.

## 2025-07-22 ENCOUNTER — OFFICE VISIT (OUTPATIENT)
Dept: PRIMARY CARE | Facility: CLINIC | Age: 40
End: 2025-07-22
Payer: COMMERCIAL

## 2025-07-22 VITALS
HEART RATE: 90 BPM | OXYGEN SATURATION: 96 % | DIASTOLIC BLOOD PRESSURE: 86 MMHG | SYSTOLIC BLOOD PRESSURE: 126 MMHG | BODY MASS INDEX: 36.76 KG/M2 | WEIGHT: 262.6 LBS | HEIGHT: 71 IN | TEMPERATURE: 96.8 F

## 2025-07-22 DIAGNOSIS — Z00.00 ANNUAL PHYSICAL EXAM: ICD-10-CM

## 2025-07-22 DIAGNOSIS — Z11.3 ROUTINE SCREENING FOR STI (SEXUALLY TRANSMITTED INFECTION): ICD-10-CM

## 2025-07-22 DIAGNOSIS — E66.01 MORBID OBESITY (MULTI): ICD-10-CM

## 2025-07-22 DIAGNOSIS — Z13.31 DEPRESSION SCREENING: ICD-10-CM

## 2025-07-22 DIAGNOSIS — R94.4 DECREASED GFR: ICD-10-CM

## 2025-07-22 DIAGNOSIS — Z11.59 ENCOUNTER FOR HEPATITIS C SCREENING TEST FOR LOW RISK PATIENT: ICD-10-CM

## 2025-07-22 DIAGNOSIS — F31.9 BIPOLAR AFFECTIVE DISORDER, REMISSION STATUS UNSPECIFIED (MULTI): ICD-10-CM

## 2025-07-22 DIAGNOSIS — R10.9 ABDOMINAL PAIN, UNSPECIFIED ABDOMINAL LOCATION: ICD-10-CM

## 2025-07-22 DIAGNOSIS — Z71.89 ADVANCED CARE PLANNING/COUNSELING DISCUSSION: ICD-10-CM

## 2025-07-22 DIAGNOSIS — Z71.89 CARDIAC RISK COUNSELING: ICD-10-CM

## 2025-07-22 DIAGNOSIS — Z12.31 BREAST CANCER SCREENING BY MAMMOGRAM: ICD-10-CM

## 2025-07-22 DIAGNOSIS — E78.2 MIXED HYPERLIPIDEMIA: ICD-10-CM

## 2025-07-22 DIAGNOSIS — Z00.00 MEDICARE ANNUAL WELLNESS VISIT, SUBSEQUENT: Primary | ICD-10-CM

## 2025-07-22 DIAGNOSIS — K59.00 CONSTIPATION, UNSPECIFIED CONSTIPATION TYPE: ICD-10-CM

## 2025-07-22 DIAGNOSIS — L65.9 HAIR LOSS: ICD-10-CM

## 2025-07-22 DIAGNOSIS — Z12.4 CERVICAL CANCER SCREENING: ICD-10-CM

## 2025-07-22 DIAGNOSIS — E04.9 ENLARGED THYROID: ICD-10-CM

## 2025-07-22 DIAGNOSIS — E55.9 VITAMIN D DEFICIENCY: ICD-10-CM

## 2025-07-22 DIAGNOSIS — L21.9 SEBORRHEIC DERMATITIS: ICD-10-CM

## 2025-07-22 DIAGNOSIS — Z01.89 ENCOUNTER FOR ROUTINE LABORATORY TESTING: ICD-10-CM

## 2025-07-22 PROCEDURE — G0446 INTENS BEHAVE THER CARDIO DX: HCPCS | Performed by: FAMILY MEDICINE

## 2025-07-22 PROCEDURE — 99214 OFFICE O/P EST MOD 30 MIN: CPT | Performed by: FAMILY MEDICINE

## 2025-07-22 PROCEDURE — G0444 DEPRESSION SCREEN ANNUAL: HCPCS | Performed by: FAMILY MEDICINE

## 2025-07-22 PROCEDURE — 99497 ADVNCD CARE PLAN 30 MIN: CPT | Performed by: FAMILY MEDICINE

## 2025-07-22 PROCEDURE — 99215 OFFICE O/P EST HI 40 MIN: CPT | Performed by: FAMILY MEDICINE

## 2025-07-22 PROCEDURE — 99497 ADVNCD CARE PLAN 30 MIN: CPT | Mod: 33,25 | Performed by: FAMILY MEDICINE

## 2025-07-22 PROCEDURE — 99386 PREV VISIT NEW AGE 40-64: CPT | Mod: 25 | Performed by: FAMILY MEDICINE

## 2025-07-22 PROCEDURE — 99214 OFFICE O/P EST MOD 30 MIN: CPT | Mod: 25 | Performed by: FAMILY MEDICINE

## 2025-07-22 PROCEDURE — G0439 PPPS, SUBSEQ VISIT: HCPCS | Performed by: FAMILY MEDICINE

## 2025-07-22 PROCEDURE — 3008F BODY MASS INDEX DOCD: CPT | Performed by: FAMILY MEDICINE

## 2025-07-22 PROCEDURE — 99386 PREV VISIT NEW AGE 40-64: CPT | Performed by: FAMILY MEDICINE

## 2025-07-22 RX ORDER — POLYETHYLENE GLYCOL 3350 17 G/17G
8.5 POWDER, FOR SOLUTION ORAL DAILY
Qty: 15 PACKET | Refills: 3 | Status: SHIPPED | OUTPATIENT
Start: 2025-07-22 | End: 2025-11-19

## 2025-07-22 RX ORDER — KETOCONAZOLE 20 MG/ML
SHAMPOO, SUSPENSION TOPICAL 2 TIMES WEEKLY
Qty: 120 ML | Refills: 11 | Status: SHIPPED | OUTPATIENT
Start: 2025-07-24

## 2025-07-22 ASSESSMENT — PATIENT HEALTH QUESTIONNAIRE - PHQ9
9. THOUGHTS THAT YOU WOULD BE BETTER OFF DEAD, OR OF HURTING YOURSELF: SEVERAL DAYS
6. FEELING BAD ABOUT YOURSELF - OR THAT YOU ARE A FAILURE OR HAVE LET YOURSELF OR YOUR FAMILY DOWN: SEVERAL DAYS
1. LITTLE INTEREST OR PLEASURE IN DOING THINGS: SEVERAL DAYS
7. TROUBLE CONCENTRATING ON THINGS, SUCH AS READING THE NEWSPAPER OR WATCHING TELEVISION: SEVERAL DAYS
SUM OF ALL RESPONSES TO PHQ9 QUESTIONS 1 AND 2: 2
3. TROUBLE FALLING OR STAYING ASLEEP OR SLEEPING TOO MUCH: SEVERAL DAYS
SUM OF ALL RESPONSES TO PHQ QUESTIONS 1-9: 10
8. MOVING OR SPEAKING SO SLOWLY THAT OTHER PEOPLE COULD HAVE NOTICED. OR THE OPPOSITE, BEING SO FIGETY OR RESTLESS THAT YOU HAVE BEEN MOVING AROUND A LOT MORE THAN USUAL: NEARLY EVERY DAY
5. POOR APPETITE OR OVEREATING: NOT AT ALL
4. FEELING TIRED OR HAVING LITTLE ENERGY: SEVERAL DAYS
2. FEELING DOWN, DEPRESSED OR HOPELESS: SEVERAL DAYS

## 2025-07-22 ASSESSMENT — COLUMBIA-SUICIDE SEVERITY RATING SCALE - C-SSRS
6. HAVE YOU EVER DONE ANYTHING, STARTED TO DO ANYTHING, OR PREPARED TO DO ANYTHING TO END YOUR LIFE?: NO
2. HAVE YOU ACTUALLY HAD ANY THOUGHTS OF KILLING YOURSELF?: NO
1. IN THE PAST MONTH, HAVE YOU WISHED YOU WERE DEAD OR WISHED YOU COULD GO TO SLEEP AND NOT WAKE UP?: YES

## 2025-07-22 ASSESSMENT — ANXIETY QUESTIONNAIRES
IF YOU CHECKED OFF ANY PROBLEMS ON THIS QUESTIONNAIRE, HOW DIFFICULT HAVE THESE PROBLEMS MADE IT FOR YOU TO DO YOUR WORK, TAKE CARE OF THINGS AT HOME, OR GET ALONG WITH OTHER PEOPLE: EXTREMELY DIFFICULT
7. FEELING AFRAID AS IF SOMETHING AWFUL MIGHT HAPPEN: NEARLY EVERY DAY
5. BEING SO RESTLESS THAT IT IS HARD TO SIT STILL: SEVERAL DAYS
6. BECOMING EASILY ANNOYED OR IRRITABLE: SEVERAL DAYS
1. FEELING NERVOUS, ANXIOUS, OR ON EDGE: SEVERAL DAYS
3. WORRYING TOO MUCH ABOUT DIFFERENT THINGS: MORE THAN HALF THE DAYS
GAD7 TOTAL SCORE: 11
4. TROUBLE RELAXING: SEVERAL DAYS
2. NOT BEING ABLE TO STOP OR CONTROL WORRYING: MORE THAN HALF THE DAYS

## 2025-07-22 ASSESSMENT — PAIN SCALES - GENERAL: PAINLEVEL_OUTOF10: 7

## 2025-07-23 ENCOUNTER — TELEPHONE (OUTPATIENT)
Dept: PRIMARY CARE | Facility: CLINIC | Age: 40
End: 2025-07-23
Payer: COMMERCIAL

## 2025-07-24 ENCOUNTER — TELEPHONE (OUTPATIENT)
Dept: PRIMARY CARE | Facility: CLINIC | Age: 40
End: 2025-07-24
Payer: COMMERCIAL

## 2025-07-24 NOTE — TELEPHONE ENCOUNTER
"Lalitha from [facility] requesting clarification on the directions for Polyethylene Packets. The script is written for the packets but also states \"to mix 1.5 capfull\". Pharmacy asking if the jug is to be mixed and dispensed or dispense the packets? Please clarify and advise.  "

## 2025-07-24 NOTE — TELEPHONE ENCOUNTER
Please call sp garcia back to clarify: she should shampoo daily for 2 weeks and then 2-3 time per week thereafter.  Thank you

## 2025-07-24 NOTE — TELEPHONE ENCOUNTER
Pharmacy calling for clarification on RX miralax. RX was sent to dispense PACKETS, but instructions say to mix 1/2 capful. Pharmacy wanting clarification on if the packets should be given, or if RX should be the bottle for 1/2 capful as instructed. Please contact pharmacy with clarification.

## 2025-07-24 NOTE — TELEPHONE ENCOUNTER
Patient lvm asking for the pharmacy to be called to clarify the requested information needed so she can  her Rx's. Patient stated that her hair is falling out and she needs her shampoo. Please advise.

## 2025-07-24 NOTE — TELEPHONE ENCOUNTER
"Lalitha from [facility] regarding an order for Ketoconazole Shampoo. Need clarification on the directions. Order states \"Use twice a week and shampoo daily\".  Please advise.  "

## 2025-07-24 NOTE — TELEPHONE ENCOUNTER
Please call pharmacy.  Either the packets or the cap full is fine.  Whatever is covered by her insurance. Thank you

## 2025-08-01 ENCOUNTER — HOSPITAL ENCOUNTER (OUTPATIENT)
Dept: RADIOLOGY | Facility: HOSPITAL | Age: 40
Discharge: HOME | End: 2025-08-01
Payer: COMMERCIAL

## 2025-08-01 DIAGNOSIS — E04.9 ENLARGED THYROID: ICD-10-CM

## 2025-08-01 PROCEDURE — 76536 US EXAM OF HEAD AND NECK: CPT

## 2025-08-04 ENCOUNTER — TELEPHONE (OUTPATIENT)
Dept: PRIMARY CARE | Facility: CLINIC | Age: 40
End: 2025-08-04
Payer: COMMERCIAL

## 2025-08-04 NOTE — TELEPHONE ENCOUNTER
Pt left voicemail wanting to know if she is able to complete her lab testing at [facility].

## 2025-08-09 LAB
25(OH)D3+25(OH)D2 SERPL-MCNC: 46 NG/ML (ref 30–100)
ALBUMIN SERPL-MCNC: 4 G/DL (ref 3.6–5.1)
ALBUMIN/CREAT UR: 4 MG/G CREAT
ALP SERPL-CCNC: 85 U/L (ref 31–125)
ALT SERPL-CCNC: 10 U/L (ref 6–29)
AMYLASE SERPL-CCNC: 65 U/L (ref 21–101)
ANION GAP SERPL CALCULATED.4IONS-SCNC: 5 MMOL/L (CALC) (ref 7–17)
APPEARANCE UR: CLEAR
AST SERPL-CCNC: 11 U/L (ref 10–30)
BACTERIA #/AREA URNS HPF: ABNORMAL /HPF
BASOPHILS # BLD AUTO: 20 CELLS/UL (ref 0–200)
BASOPHILS NFR BLD AUTO: 0.3 %
BILIRUB SERPL-MCNC: 0.3 MG/DL (ref 0.2–1.2)
BILIRUB UR QL STRIP: NEGATIVE
BUN SERPL-MCNC: 6 MG/DL (ref 7–25)
CALCIUM SERPL-MCNC: 9 MG/DL (ref 8.6–10.2)
CHLORIDE SERPL-SCNC: 107 MMOL/L (ref 98–110)
CO2 SERPL-SCNC: 25 MMOL/L (ref 20–32)
COLOR UR: YELLOW
CREAT SERPL-MCNC: 1.03 MG/DL (ref 0.5–0.99)
CREAT UR-MCNC: 414 MG/DL (ref 20–275)
EGFRCR SERPLBLD CKD-EPI 2021: 70 ML/MIN/1.73M2
EOSINOPHIL # BLD AUTO: 27 CELLS/UL (ref 15–500)
EOSINOPHIL NFR BLD AUTO: 0.4 %
ERYTHROCYTE [DISTWIDTH] IN BLOOD BY AUTOMATED COUNT: 14.4 % (ref 11–15)
GLUCOSE SERPL-MCNC: 102 MG/DL (ref 65–99)
GLUCOSE UR QL STRIP: NEGATIVE
HCT VFR BLD AUTO: 33.7 % (ref 35–45)
HCV AB SERPL QL IA: NORMAL
HGB BLD-MCNC: 10.5 G/DL (ref 11.7–15.5)
HGB UR QL STRIP: NEGATIVE
HYALINE CASTS #/AREA URNS LPF: ABNORMAL /LPF
KETONES UR QL STRIP: NEGATIVE
LEUKOCYTE ESTERASE UR QL STRIP: NEGATIVE
LIPASE SERPL-CCNC: 66 U/L (ref 7–60)
LYMPHOCYTES # BLD AUTO: 3101 CELLS/UL (ref 850–3900)
LYMPHOCYTES NFR BLD AUTO: 45.6 %
MCH RBC QN AUTO: 26.5 PG (ref 27–33)
MCHC RBC AUTO-ENTMCNC: 31.2 G/DL (ref 32–36)
MCV RBC AUTO: 85.1 FL (ref 80–100)
MICROALBUMIN UR-MCNC: 1.6 MG/DL
MONOCYTES # BLD AUTO: 503 CELLS/UL (ref 200–950)
MONOCYTES NFR BLD AUTO: 7.4 %
NEUTROPHILS # BLD AUTO: 3148 CELLS/UL (ref 1500–7800)
NEUTROPHILS NFR BLD AUTO: 46.3 %
NITRITE UR QL STRIP: NEGATIVE
PH UR STRIP: 6 [PH] (ref 5–8)
PLATELET # BLD AUTO: 354 THOUSAND/UL (ref 140–400)
PMV BLD REES-ECKER: 9.9 FL (ref 7.5–12.5)
POTASSIUM SERPL-SCNC: 4.1 MMOL/L (ref 3.5–5.3)
PROT SERPL-MCNC: 6.7 G/DL (ref 6.1–8.1)
PROT UR QL STRIP: ABNORMAL
RBC # BLD AUTO: 3.96 MILLION/UL (ref 3.8–5.1)
RBC #/AREA URNS HPF: ABNORMAL /HPF
SERVICE CMNT-IMP: ABNORMAL
SODIUM SERPL-SCNC: 137 MMOL/L (ref 135–146)
SP GR UR STRIP: 1.03 (ref 1–1.03)
SQUAMOUS #/AREA URNS HPF: ABNORMAL /HPF
TSH SERPL-ACNC: 2.4 MIU/L
WBC # BLD AUTO: 6.8 THOUSAND/UL (ref 3.8–10.8)
WBC #/AREA URNS HPF: ABNORMAL /HPF

## 2025-08-11 LAB
25(OH)D3+25(OH)D2 SERPL-MCNC: 46 NG/ML (ref 30–100)
ALBUMIN SERPL-MCNC: 4 G/DL (ref 3.6–5.1)
ALBUMIN/CREAT UR: 4 MG/G CREAT
ALP SERPL-CCNC: 85 U/L (ref 31–125)
ALT SERPL-CCNC: 10 U/L (ref 6–29)
AMYLASE SERPL-CCNC: 65 U/L (ref 21–101)
ANION GAP SERPL CALCULATED.4IONS-SCNC: 5 MMOL/L (CALC) (ref 7–17)
APPEARANCE UR: CLEAR
AST SERPL-CCNC: 11 U/L (ref 10–30)
BACTERIA #/AREA URNS HPF: ABNORMAL /HPF
BASOPHILS # BLD AUTO: 20 CELLS/UL (ref 0–200)
BASOPHILS NFR BLD AUTO: 0.3 %
BILIRUB SERPL-MCNC: 0.3 MG/DL (ref 0.2–1.2)
BILIRUB UR QL STRIP: NEGATIVE
BUN SERPL-MCNC: 6 MG/DL (ref 7–25)
CALCIUM SERPL-MCNC: 9 MG/DL (ref 8.6–10.2)
CHLORIDE SERPL-SCNC: 107 MMOL/L (ref 98–110)
CO2 SERPL-SCNC: 25 MMOL/L (ref 20–32)
COLOR UR: YELLOW
CREAT SERPL-MCNC: 1.03 MG/DL (ref 0.5–0.99)
CREAT UR-MCNC: 414 MG/DL (ref 20–275)
EGFRCR SERPLBLD CKD-EPI 2021: 70 ML/MIN/1.73M2
EOSINOPHIL # BLD AUTO: 27 CELLS/UL (ref 15–500)
EOSINOPHIL NFR BLD AUTO: 0.4 %
ERYTHROCYTE [DISTWIDTH] IN BLOOD BY AUTOMATED COUNT: 14.4 % (ref 11–15)
FOLATE SERPL-MCNC: 7.1 NG/ML
GLUCOSE SERPL-MCNC: 102 MG/DL (ref 65–99)
GLUCOSE UR QL STRIP: NEGATIVE
HCT VFR BLD AUTO: 33.7 % (ref 35–45)
HCV AB SERPL QL IA: NORMAL
HGB BLD-MCNC: 10.5 G/DL (ref 11.7–15.5)
HGB UR QL STRIP: NEGATIVE
HYALINE CASTS #/AREA URNS LPF: ABNORMAL /LPF
IRON SATN MFR SERPL: 11 % (CALC) (ref 16–45)
IRON SERPL-MCNC: 36 MCG/DL (ref 40–190)
KETONES UR QL STRIP: NEGATIVE
LEUKOCYTE ESTERASE UR QL STRIP: NEGATIVE
LIPASE SERPL-CCNC: 66 U/L (ref 7–60)
LYMPHOCYTES # BLD AUTO: 3101 CELLS/UL (ref 850–3900)
LYMPHOCYTES NFR BLD AUTO: 45.6 %
MCH RBC QN AUTO: 26.5 PG (ref 27–33)
MCHC RBC AUTO-ENTMCNC: 31.2 G/DL (ref 32–36)
MCV RBC AUTO: 85.1 FL (ref 80–100)
MICROALBUMIN UR-MCNC: 1.6 MG/DL
MONOCYTES # BLD AUTO: 503 CELLS/UL (ref 200–950)
MONOCYTES NFR BLD AUTO: 7.4 %
NEUTROPHILS # BLD AUTO: 3148 CELLS/UL (ref 1500–7800)
NEUTROPHILS NFR BLD AUTO: 46.3 %
NITRITE UR QL STRIP: NEGATIVE
PH UR STRIP: 6 [PH] (ref 5–8)
PLATELET # BLD AUTO: 354 THOUSAND/UL (ref 140–400)
PMV BLD REES-ECKER: 9.9 FL (ref 7.5–12.5)
POTASSIUM SERPL-SCNC: 4.1 MMOL/L (ref 3.5–5.3)
PROT SERPL-MCNC: 6.7 G/DL (ref 6.1–8.1)
PROT UR QL STRIP: ABNORMAL
RBC # BLD AUTO: 3.96 MILLION/UL (ref 3.8–5.1)
RBC #/AREA URNS HPF: ABNORMAL /HPF
SERVICE CMNT-IMP: ABNORMAL
SODIUM SERPL-SCNC: 137 MMOL/L (ref 135–146)
SP GR UR STRIP: 1.03 (ref 1–1.03)
SQUAMOUS #/AREA URNS HPF: ABNORMAL /HPF
TIBC SERPL-MCNC: 327 MCG/DL (CALC) (ref 250–450)
TSH SERPL-ACNC: 2.4 MIU/L
VIT B12 SERPL-MCNC: 692 PG/ML (ref 200–1100)
WBC # BLD AUTO: 6.8 THOUSAND/UL (ref 3.8–10.8)
WBC #/AREA URNS HPF: ABNORMAL /HPF

## 2025-08-22 ENCOUNTER — TELEMEDICINE CLINICAL SUPPORT (OUTPATIENT)
Dept: PRIMARY CARE | Facility: CLINIC | Age: 40
End: 2025-08-22
Payer: COMMERCIAL

## 2025-08-22 ENCOUNTER — TELEPHONE (OUTPATIENT)
Dept: PRIMARY CARE | Facility: CLINIC | Age: 40
End: 2025-08-22

## 2025-08-22 ENCOUNTER — APPOINTMENT (OUTPATIENT)
Dept: PRIMARY CARE | Facility: CLINIC | Age: 40
End: 2025-08-22
Payer: COMMERCIAL

## 2025-08-22 ENCOUNTER — APPOINTMENT (OUTPATIENT)
Dept: OBSTETRICS AND GYNECOLOGY | Facility: CLINIC | Age: 40
End: 2025-08-22
Payer: COMMERCIAL

## 2025-08-22 DIAGNOSIS — Z71.3 DIETARY COUNSELING: Primary | ICD-10-CM

## 2025-08-29 ENCOUNTER — APPOINTMENT (OUTPATIENT)
Dept: RADIOLOGY | Facility: HOSPITAL | Age: 40
End: 2025-08-29
Payer: COMMERCIAL

## 2025-08-31 ENCOUNTER — PATIENT MESSAGE (OUTPATIENT)
Dept: PRIMARY CARE | Facility: CLINIC | Age: 40
End: 2025-08-31
Payer: COMMERCIAL

## 2025-09-19 ENCOUNTER — APPOINTMENT (OUTPATIENT)
Dept: RADIOLOGY | Facility: HOSPITAL | Age: 40
End: 2025-09-19
Payer: COMMERCIAL

## 2025-10-03 ENCOUNTER — APPOINTMENT (OUTPATIENT)
Dept: OBSTETRICS AND GYNECOLOGY | Facility: CLINIC | Age: 40
End: 2025-10-03
Payer: COMMERCIAL

## 2025-10-07 ENCOUNTER — APPOINTMENT (OUTPATIENT)
Dept: GASTROENTEROLOGY | Facility: HOSPITAL | Age: 40
End: 2025-10-07
Payer: COMMERCIAL

## 2025-11-07 ENCOUNTER — APPOINTMENT (OUTPATIENT)
Dept: DERMATOLOGY | Facility: CLINIC | Age: 40
End: 2025-11-07
Payer: COMMERCIAL

## 2026-01-15 ENCOUNTER — APPOINTMENT (OUTPATIENT)
Dept: DERMATOLOGY | Facility: CLINIC | Age: 41
End: 2026-01-15
Payer: COMMERCIAL

## 2026-01-22 ENCOUNTER — APPOINTMENT (OUTPATIENT)
Facility: CLINIC | Age: 41
End: 2026-01-22
Payer: COMMERCIAL

## 2026-01-29 ENCOUNTER — APPOINTMENT (OUTPATIENT)
Dept: NEPHROLOGY | Facility: CLINIC | Age: 41
End: 2026-01-29
Payer: COMMERCIAL